# Patient Record
Sex: MALE
[De-identification: names, ages, dates, MRNs, and addresses within clinical notes are randomized per-mention and may not be internally consistent; named-entity substitution may affect disease eponyms.]

---

## 2017-05-18 ENCOUNTER — HOSPITAL ENCOUNTER (EMERGENCY)
Dept: HOSPITAL 25 - UCCORT | Age: 47
Discharge: HOME | End: 2017-05-18
Payer: SELF-PAY

## 2017-05-18 VITALS — DIASTOLIC BLOOD PRESSURE: 87 MMHG | SYSTOLIC BLOOD PRESSURE: 134 MMHG
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
## 2017-05-18 DIAGNOSIS — S39.011A: Primary | ICD-10-CM

## 2017-05-18 DIAGNOSIS — E66.9: ICD-10-CM
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
## 2017-05-18 PROCEDURE — 99202 OFFICE O/P NEW SF 15 MIN: CPT

## 2017-05-18 PROCEDURE — G0463 HOSPITAL OUTPT CLINIC VISIT: HCPCS

## 2017-05-18 NOTE — UC
Abdominal Pain Male HPI





- HPI Summary


HPI Summary: 





Was working in an attic crawl space around 1 pm today. Reached out for hand 

 and had a painful pulling in the left flank, with persistent pain 

since that time. No shortness of breath, able to void normally, no nausea or 

vomiting. Does not feel very well, but was working in attic space at high 

temperature for about 5 hours. Pain worsened by getting in and out of truck and 

all movements. Took acetaminophen 1000mg at the time, with a little relief of 

pain. 


Hx of abdominal surgery. 





- History of Current Complaint


Chief Complaint: UCAbdominalPain


Stated Complaint: LEFT LOWER ABD MUSCLE PULL W/C


Time Seen by Provider: 17 17:01


Hx Obtained From: Patient


Onset/Duration: Sudden Onset, Lasting Hours - 4, Still Present


Severity Initially: Moderate


Severity Currently: Moderate


Pain Intensity: 8


Pain Scale Used: 0-10 Numeric


Location: Other - left mid abdominal area.


Radiates: No


Character: Cramping, Sharp


Aggravating Factor(s):: Movement


Alleviating Factor(s): Rest





- Risk Factors


Testicular Torsion: Negative


Cardiac Risk Factors: Hypertension





- Allergies/Home Medications


Allergies/Adverse Reactions: 


 Allergies











Allergy/AdvReac Type Severity Reaction Status Date / Time


 


No Known Allergies Allergy   Verified 17 16:30











Home Medications: 


 Home Medications





NK [No Home Medications Reported]  17 [History Confirmed 17]











PMH/Surg Hx/FS Hx/Imm Hx





- Additional Past Medical History


Additional PMH: 





Has not seen PMD in 4 years, but blood pressures have run high in the past. Not 

treated. Aware of elevation. 


History of testicular cancer





- Surgical History


Surgical History: Yes


Surgery Procedure, Year, and Place: testicular surgery





- Family History


Known Family History: Positive: Respiratory Disease - mother  lung CA, 

Other - father  of stroke.





- Social History


Occupation: Employed Full-time - by [employer]


Lives: With Family


Alcohol Use: Occasionally


Substance Use Type: None


Smoking Status (MU): Never Smoked Tobacco





Review of Systems


Constitutional: Other - pain in the left abdomen with deep inspiration. Feeling 

unwell, but worked all day in an attic room with high ambient temperature.


ENT: Other


Cardiovascular: Other - elevated blood pressure in the past, not treated. He 

has not seen his PMD in about 4 years. No chest pain or shortness of breath.


Musculoskeletal: Myalgia - pain in the left abdominal wall.


All Other Systems Reviewed And Are Negative: Yes





Physical Exam


Triage Information Reviewed: Yes


Appearance: Pain Distress - moderate with movement., Obese


Vital Signs: 


 Initial Vital Signs











Temp  99.8 F   17 16:26


 


Pulse  95   17 16:26


 


Resp  17   17 16:26


 


BP  161/73   17 16:26


 


Pulse Ox  96   17 16:26











Vital Signs Reviewed: Yes


Eyes: Positive: Conjunctiva Clear


ENT: Positive: Pharynx normal


Neck: Positive: Supple, Nontender, No Lymphadenopathy


Respiratory: Positive: Lungs clear, Normal breath sounds


Cardiovascular: Positive: RRR, No Murmur


Abdomen Description: Positive: No Organomegaly, Soft, Other: - midline scar 

epigastrium to above pubis-->no evidence of incisional hernia.  Tender to 

palpation in the lateral oblique muscles. Pain with movement.  No abdominal 

guarding or rebound.


Bowel Sounds: Positive: Present


Musculoskeletal: Positive: Strength Intact, ROM Limited @ - uncomfortable with 

flexion at the waist.


Neurological Exam: Normal


Neurological: Positive: Alert, Muscle Tone Normal


Psychological Exam: Normal


Skin Exam: Normal





Abd Pain Male Course/Dx





- Course


Course Of Treatment: analgesics and rest.





- Differential Dx/Clinical Impression


Differential Diagnosis/HQI/PQRI: Other - hernia, muscle strain


Provider Diagnoses: strain left abdominal muscles (obliques)





Discharge





- Discharge Plan


Condition: Stable


Disposition: HOME


Patient Education Materials:  Muscle Strain (ED)


Forms:  *Work Release


Referrals: 


No Primary Care Phys,NOPCP [Primary Care Provider] - 


Additional Instructions: 


Today's blood pressure reading was elevated to 161/73, and decreased to 134/87 

at discharge (still a little elevated). Please ensure that you schedule a visit 

with your primary care provider. 


You can use acetaminophen 100mg up to 4 times per day for pain, and can use 

additional ibuprofen 600mg 2 or 3 times daily with caution.  Ensure that you 

take it with food. 


Rest at home tomorrow, anticipate return to work on 17

## 2017-08-07 ENCOUNTER — HOSPITAL ENCOUNTER (EMERGENCY)
Dept: HOSPITAL 25 - UCCORT | Age: 47
Discharge: HOME | End: 2017-08-07
Payer: COMMERCIAL

## 2017-08-07 VITALS — DIASTOLIC BLOOD PRESSURE: 88 MMHG | SYSTOLIC BLOOD PRESSURE: 155 MMHG

## 2017-08-07 DIAGNOSIS — T63.441A: Primary | ICD-10-CM
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
## 2017-08-07 DIAGNOSIS — Z85.47: ICD-10-CM

## 2017-08-07 DIAGNOSIS — M79.631: ICD-10-CM

## 2017-08-07 DIAGNOSIS — I10: ICD-10-CM

## 2017-08-07 DIAGNOSIS — Z72.0: ICD-10-CM

## 2017-08-07 PROCEDURE — 99212 OFFICE O/P EST SF 10 MIN: CPT

## 2017-08-07 PROCEDURE — G0463 HOSPITAL OUTPT CLINIC VISIT: HCPCS

## 2017-08-07 NOTE — UC
Skin Complaint HPI





- HPI Summary


HPI Summary: 





48 yo male stung on right thumb 3 days ago while at work





now pain and redness extending to dorsum or hand and radial aspect of forearm











- History of Current Complaint


Chief Complaint: UCSkin


Time Seen by Provider: 17 07:08


Stated Complaint: RIGHT HAND BEE STING-WC


Hx Obtained From: Patient


Onset/Duration: Gradual Onset


Onset Severity: Moderate


Current Severity: Moderate


Pain Intensity: 3


Pain Scale Used: 0-10 Numeric


Location: Discrete


Character: Swelling, Pain, Redness


Aggravating: Touch


Alleviating: Antihistamines


Associated Signs & Symptoms: Positive: Tenderness





- Allergy/Home Medications


Allergies/Adverse Reactions: 


 Allergies











Allergy/AdvReac Type Severity Reaction Status Date / Time


 


No Known Allergies Allergy   Verified 17 07:04











Home Medications: 


 Home Medications





diPHENhydraMINE PO* [Benadryl PO 25 MG TAB*] 50 mg PO ONCE PRN 17 [

History Confirmed 17]











Review of Systems


Constitutional: Negative


Skin: Negative


Eyes: Negative


ENT: Negative


Respiratory: Negative


Cardiovascular: Negative


Gastrointestinal: Negative


Genitourinary: Negative


Motor: Negative


Neurovascular: Negative


Musculoskeletal: Negative


Neurological: Negative


Psychological: Negative


All Other Systems Reviewed And Are Negative: Yes





PMH/Surg Hx/FS Hx/Imm Hx


Previously Healthy: Yes


Cardiovascular History: Hypertension - never treated


Cancer History: Other


Other Cancer History: testicular





- Surgical History


Surgical History: Yes


Surgery Procedure, Year, and Place: testicular surgery





- Family History


Known Family History: Positive: Hypertension, Respiratory Disease - mother  

lung CA, Other - father  of stroke.





- Social History


Alcohol Use: Occasionally


Substance Use Type: None


Smoking Status (MU): Current Some Day Smoker


Type: Smokeless Tobacco


Amount Used/How Often: occasional use





Physical Exam


Triage Information Reviewed: Yes


Appearance: Well-Appearing, No Pain Distress, Well-Nourished


Vital Signs: 


 Initial Vital Signs











Temp  98.2 F   17 07:05


 


Pulse  57   17 07:05


 


Resp  16   17 07:05


 


BP  155/88   17 07:05


 


Pulse Ox  99   17 07:05











Vital Signs Reviewed: Yes


Eyes: Positive: Conjunctiva Clear


ENT: Positive: Hearing grossly normal.  Negative: Nasal congestion, Nasal 

drainage, Tonsillar exudate, Trismus, Muffled/hoarse voice


Neck: Positive: Supple


Respiratory: Positive: Lungs clear, Normal breath sounds, No respiratory 

distress


Cardiovascular: Positive: RRR, No Murmur





Course/Dx





- Diagnoses


Provider Diagnoses: local reaction to insect stings.  ? cellulitis





Discharge





- Discharge Plan


Condition: Stable


Disposition: HOME


Prescriptions: 


Cephalexin CAP* [Keflex 500 CAP*] 500 mg PO QID #28 cap


Prednisone 60 mg PO DAILY #6 tab


Patient Education Materials:  Insect Bite or Sting (ED)


[place]: 


[facility] [place] (you need to find a primary care MD to follow 

your BP)


Additional Instructions: 


recheck in 3 days if not markedly improved











Images


Hands: 


  __________________________














  __________________________





 1 - stings





 2 - edema/redness





 3 - erthema/redness

## 2017-08-29 ENCOUNTER — HOSPITAL ENCOUNTER (EMERGENCY)
Dept: HOSPITAL 25 - UCEAST | Age: 47
Discharge: HOME | End: 2017-08-29
Payer: COMMERCIAL

## 2017-08-29 VITALS — DIASTOLIC BLOOD PRESSURE: 87 MMHG | SYSTOLIC BLOOD PRESSURE: 155 MMHG

## 2017-08-29 DIAGNOSIS — Z87.891: ICD-10-CM

## 2017-08-29 DIAGNOSIS — T78.40XA: ICD-10-CM

## 2017-08-29 DIAGNOSIS — T63.441A: Primary | ICD-10-CM

## 2017-08-29 PROCEDURE — G0463 HOSPITAL OUTPT CLINIC VISIT: HCPCS

## 2017-08-29 PROCEDURE — 99212 OFFICE O/P EST SF 10 MIN: CPT

## 2017-08-29 PROCEDURE — 96372 THER/PROPH/DIAG INJ SC/IM: CPT

## 2017-08-29 NOTE — UC
Skin Complaint HPI





- HPI Summary


HPI Summary: 





Patient presents following a bee sting to the left side of his neck, he 

complains of a sensation of tightness and closing of his throat. He also states 

that he has some pain at the site of the sting. He notes no previous allergies 

or problems from bee stings. 





- History of Current Complaint


Chief Complaint: UCAllergicReaction


Time Seen by Provider: 17 10:11


Stated Complaint: ALLERGIC REACTION


Hx Obtained From: Patient


Onset/Duration: Sudden Onset


Skin Exposure Onset/Duration: Minutes Ago


Onset Severity: Moderate


Current Severity: Moderate


Location: Discrete - left side of neck.


Aggravating: Touch


Alleviating: Nothing





- Allergy/Home Medications


Allergies/Adverse Reactions: 


 Allergies











Allergy/AdvReac Type Severity Reaction Status Date / Time


 


No Known Allergies Allergy   Verified 17 10:13














Review of Systems


Skin: Rash


ENT: Other - tighness in throat


Respiratory: Negative


Cardiovascular: Negative


Gastrointestinal: Negative


Genitourinary: Negative


Neurovascular: Negative


Musculoskeletal: Negative


All Other Systems Reviewed And Are Negative: Yes





PMH/Surg Hx/FS Hx/Imm Hx


Previously Healthy: Yes





- Surgical History


Surgical History: Yes


Surgery Procedure, Year, and Place: testicular surgery





- Family History


Known Family History: Positive: Hypertension, Respiratory Disease - mother  

lung CA, Other





- Social History


Occupation: Employed Full-time


Alcohol Use: None


Substance Use Type: None


Smoking Status (MU): Former Smoker


Type: Smokeless Tobacco


Amount Used/How Often: occasional use


Household Exposure Type: Cigarettes





Physical Exam


Triage Information Reviewed: Yes


Appearance: Well-Appearing


Vital Signs: 


 Initial Vital Signs











Temp  98.0 F   17 10:10


 


Pulse  65   17 10:10


 


Resp  18   17 10:10


 


BP  183/109   17 10:10


 


Pulse Ox  96   17 10:10











Vital Signs Reviewed: Yes


Eye Exam: Normal


ENT Exam: Normal


Neck exam: Normal


Neck: Positive: Other: - left anterior base of neck with red, raised circular 

area, no stinger, or puncture wound noted.


Respiratory Exam: Normal


Cardiovascular Exam: Normal


Abdominal Exam: Normal


Musculoskeletal: Positive: Strength Limited @ - left wrist, ROM Limited @ - 

left wrist, Edema @ - left wrist





Course/Dx





- Course


Course Of Treatment: Patient presents s/p bee sting with tightness in throat, 

epi 0.3mg, solu-medrol 125mg, and benedryl 50 mg, im was given. Patients 

symtpoms improved, he was monitored for 30 minutes following with no rebound 

and was dischared home with additional rx for the same. He was encouraged to go 

to the er if any other symtpoms returned. at discharge he was stable.





- Differential Diagnoses - Skin Complaint


Differential Diagnoses: Other - bee sting allergic reaction





- Diagnoses


Provider Diagnoses: bee sting.  allergic reaction





Discharge





- Discharge Plan


Condition: Stable


Disposition: HOME


Prescriptions: 


Epinephrine [Epipen 2-Darin] 0.3 mg IM ONCE PRN #1 inj


 PRN Reason: bee sting


Famotidine TAB* [Pepcid 20 MG TAB*] 20 mg PO BID #10 tab


diPHENhydraMINE PO* [Benadryl PO 25 MG TAB*] 25 mg PO Q6H PRN #20 tab


 PRN Reason: allergic reaction


predniSONE TAB* [Deltasone TAB*] 20 mg PO DAILY #5 tab


Patient Education Materials:  Insect Bite or Sting (ED), Allergies (ED)


Referrals: 


No Primary Care Phys,NOPCP [Primary Care Provider] -

## 2018-11-08 ENCOUNTER — HOSPITAL ENCOUNTER (EMERGENCY)
Dept: HOSPITAL 25 - UCCORT | Age: 48
Discharge: HOME | End: 2018-11-08
Payer: COMMERCIAL

## 2018-11-08 VITALS — SYSTOLIC BLOOD PRESSURE: 155 MMHG | DIASTOLIC BLOOD PRESSURE: 99 MMHG

## 2018-11-08 DIAGNOSIS — J18.9: Primary | ICD-10-CM

## 2018-11-08 DIAGNOSIS — Z87.891: ICD-10-CM

## 2018-11-08 PROCEDURE — 96372 THER/PROPH/DIAG INJ SC/IM: CPT

## 2018-11-08 PROCEDURE — 99213 OFFICE O/P EST LOW 20 MIN: CPT

## 2018-11-08 PROCEDURE — G0463 HOSPITAL OUTPT CLINIC VISIT: HCPCS

## 2018-11-08 PROCEDURE — 71046 X-RAY EXAM CHEST 2 VIEWS: CPT

## 2018-11-08 NOTE — ED
Respiratory





- HPI Summary


HPI Summary: 





pt presents for evaluation of his cough.  he states he has been sick for the 

past 3 weeks.  he states his wife has been sick and he just wants to sleep.  he 

has a productive cough.  he states that he has had intermittent occasional 

wheezing.  he denies any chest pain, n/v.





- History of Current Complaint


Chief Complaint: UCGeneralIllness


Stated Complaint: ACHY,COUGH,FATIGUE


Hx Obtained From: Patient


Onset/Duration: Gradual Onset


Initial Severity: Mild


Current Severity: Mild


Pain Intensity: 8


Character: Wheezing, Cough (Productive)


Sputum Amount: Small


Sputum Color: Yellow


Aggravating Factor(s): URI





- Allergy/Home Medications


Allergies/Adverse Reactions: 


 Allergies











Allergy/AdvReac Type Severity Reaction Status Date / Time


 


No Known Allergies Allergy   Verified 18 07:40














PMH/Surg Hx/FS Hx/Imm Hx


Previously Healthy: Yes


Endocrine/Hematology History: 


   Denies: Hx Anticoagulant Therapy


Respiratory History: 


   Denies: Hx Asthma





- Surgical History


Surgery Procedure, Year, and Place: testicular surgery


Infectious Disease History: No


Infectious Disease History: 


   Denies: Traveled Outside the [place] in Last 30 Days





- Family History


Known Family History: Positive: Hypertension, Respiratory Disease - mother  

lung CA, Other





- Social History


Alcohol Use: None


Substance Use Type: Reports: None


Smoking Status (MU): Former Smoker


Type: Smokeless Tobacco


Amount Used/How Often: occasional use





Review of Systems


Positive: Fatigue.  Negative: Fever, Chills, Skin Diaphoresis


Eyes: Negative


ENT: Negative


Cardiovascular: Negative


Positive: Cough


Gastrointestinal: Negative


Genitourinary: Negative


Musculoskeletal: Negative


Skin: Negative


Neurological: Negative


Psychological: Normal


All Other Systems Reviewed And Are Negative: No





Physical Exam


Triage Information Reviewed: Yes


Vital Signs On Initial Exam: 


 Initial Vitals











Temp Pulse Resp BP Pulse Ox


 


 98.1 F   78   16   155/99   98 


 


 18 07:36  18 07:36  18 07:36  18 07:36  18 07:36











Vital Signs Reviewed: Yes


Appearance: Positive: Well-Appearing, No Pain Distress, Well-Nourished


Skin: Positive: Warm, Dry


Head/Face: Positive: Normal Head/Face Inspection


Eyes: Positive: Normal, EOMI, LAY


ENT: Positive: Normal ENT inspection, Hearing grossly normal, Pharynx normal


Neck: Positive: Supple, Nontender


Respiratory/Lung Sounds: Positive: Clear to Auscultation, Breath Sounds Present


Cardiovascular: Positive: Normal, RRR


Abdomen Description: Positive: Nontender, Soft


Bowel Sounds: Positive: Present


Musculoskeletal: Positive: Normal


Neurological: Positive: Normal, Sensory/Motor Intact, Alert, Oriented to Person 

Place, Time, CN Intact II-III


Psychiatric: Positive: Normal


AVPU Assessment: Alert





Diagnostics





- Vital Signs


 Vital Signs











  Temp Pulse Resp BP Pulse Ox


 


 18 07:36  98.1 F  78  16  155/99  98














- Laboratory


Lab Statement: Any lab studies that have been ordered have been reviewed, and 

results considered in the medical decision making process.





Disposition





- Course


Course Of Treatment: xray shows a left upper lobe pneumonia.  pt is non-toxic.  

pt was given im rocephin and po azithromycin.  pt given spacer and albuterol 

inhaler with instructions.  pt instructed to f/u with pcp by MOnday or go to 

the closest ED for further evaluation and care.  pt given a work excuse for 3 

days.





- Diagnoses


Provider Diagnoses: 


 Pneumonia








Discharge





- Sign-Out/Discharge


Documenting (check all that apply): Patient Departure


All imaging exams completed and their final reports reviewed: Yes





- Discharge Plan


Condition: Stable


Disposition: HOME


Prescriptions: 


Azithromyxin JEY (NF) [Z-Jey (Zithromax) 250 mg tabs #6] 2 tab PO .TODAY, THEN 

1 DAILY #6 tab


Patient Education Materials:  Bacterial Pneumonia (ED)


Referrals: 


No Primary Care Phys,NOPCP [Primary Care Provider] - 


[facility] [Provider Group]


Additional Instructions: 


take the antibiotic as instructed.  use the inhaler and spacer as instructed.  

return if worse or any new symptoms.  It is imperative to f/u with your primary 

care physician.  if you feel worse or are not better, you should go to the 

closest emergency dept for further evaluation. 





- Billing Disposition and Condition


Condition: STABLE


Disposition: Home

## 2018-11-21 ENCOUNTER — HOSPITAL ENCOUNTER (EMERGENCY)
Dept: HOSPITAL 25 - UCEAST | Age: 48
Discharge: HOME | End: 2018-11-21
Payer: COMMERCIAL

## 2018-11-21 VITALS — DIASTOLIC BLOOD PRESSURE: 92 MMHG | SYSTOLIC BLOOD PRESSURE: 161 MMHG

## 2018-11-21 DIAGNOSIS — Z87.891: ICD-10-CM

## 2018-11-21 DIAGNOSIS — R03.0: ICD-10-CM

## 2018-11-21 DIAGNOSIS — Z91.030: ICD-10-CM
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
## 2018-11-21 DIAGNOSIS — Z23: ICD-10-CM
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
## 2018-11-21 DIAGNOSIS — S51.811A: Primary | ICD-10-CM
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
## 2018-11-21 PROCEDURE — 90715 TDAP VACCINE 7 YRS/> IM: CPT

## 2018-11-21 PROCEDURE — 99211 OFF/OP EST MAY X REQ PHY/QHP: CPT

## 2018-11-21 PROCEDURE — G0463 HOSPITAL OUTPT CLINIC VISIT: HCPCS

## 2018-11-21 PROCEDURE — 90471 IMMUNIZATION ADMIN: CPT

## 2018-11-21 PROCEDURE — 12031 INTMD RPR S/A/T/EXT 2.5 CM/<: CPT

## 2018-11-21 NOTE — UC
Laceration HPI





- HPI Summary


HPI Summary: 


48 year old male with laceration to right proximal forearm that occurred at 

work. States he accidentally cut on edge of a sheet metal shelf. Bleeding 

controlled with direct pressure prior to arrival. Tetanus status unknown.








- History Of Current Complaint


Chief Complaint: UCLaceration


Stated Complaint: ARM LAC


Time Seen by Provider: 18 12:24


Hx Obtained From: Patient


Laceration Location: Arm


Onset/Duration: Sudden Onset


Pain Intensity: 0


Full Body (No Head): 


  __________________________














  __________________________





 1 - 2 cm laceration with poorly approximated wound edges and exposed adipose 

tissure. Bleeding controlled.





Related History: Occupational Injury, Dominant Hand Right





- Allergies/Home Medications


Allergies/Adverse Reactions: 


 Allergies











Allergy/AdvReac Type Severity Reaction Status Date / Time


 


bee venom protein (honey bee) Allergy Severe Anaphylatic Verified 18 12:56





   Shock  














PMH/Surg Hx/FS Hx/Imm Hx


Previously Healthy: Yes - Denies significant PMH


Other History Of: 


   Negative For: Anticoagulant Therapy





- Surgical History


Surgical History: Yes


Surgery Procedure, Year, and Place: testicular surgery





- Family History


Known Family History: Positive: Hypertension, Respiratory Disease - mother  

lung CA, Other





- Social History


Occupation: Employed Full-time


Lives: With Family


Alcohol Use: Occasionally


Substance Use Type: None


Smoking Status (MU): Former Smoker


Type: Smokeless Tobacco


Amount Used/How Often: occasional use


Household Exposure Type: Cigarettes





Review of Systems


All Other Systems Reviewed And Are Negative: Yes


Constitutional: Negative: Fever, Chills


Skin: Positive: Other - See HPI and diagram


Motor: Negative: Decreased ROM, Weakness


Neurovascular: Negative: Decreased Sensation


Musculoskeletal: Positive: Negative


Is Patient Immunocompromised?: No





Physical Exam


Triage Information Reviewed: Yes


Appearance: Well-Appearing, No Pain Distress, Well-Nourished


Vital Signs: 


 Initial Vital Signs











Temp  98.5 F   18 12:50


 


Pulse  83   18 12:50


 


Resp  18   18 12:50


 


BP  179/106   18 12:50


 


Pulse Ox  94   18 12:50











Respiratory: Positive: Lungs clear, Normal breath sounds, No respiratory 

distress


Cardiovascular: Positive: RRR, No Murmur, Pulses Normal, Brisk Capillary Refill


Musculoskeletal: Positive: Strength Intact, ROM Intact, No Edema


Neurological: Positive: Alert, Other: - Sensation intact distally


Skin: Positive: Significant Lesion(s) - See diagram





Laceration Repair





- Laceration Repair


  ** 1


Procedure Summary: 


Procedure note: Laceration repair right forearm


 


Informed consent was obtained before procedure started and the appropriate 

timeout was taken. Local anesthesia was achieved using 5 ml of lidocaine 1% 

without epinephrine. The wound was copiously irrigated with 500 ml sterile 

saline. The wound was thoroughly explored and no foreign body noted. Exposed 

adipose tissue was sharply excised using iris scissors. The wound margins were 

brought into good alignment and 3 interrupted sutures were placed using 4-0 

Ethilon.





Estimated blood loss was minimal. A dressing was applied to the area. 

Anticipatory guidance, as well as standard post-procedure care was discussed 

with patient. Return precautions are given. The patient tolerated the procedure 

well without complications.





Patient is to follow up in 10-14 days for suture removal and evaluation of the 

laceration.





Description: Linear


Laceration Size After Repair: Length (cm) - 2 cm


Contamination/FB Removal: None


Modified For Repair: Yes - Adipose tissue sharply excised


Anesthesia Used: 1.0% Lido


Cleansing Completed Via Routine Prep: Yes


Irrigation With Pressure Irrigation Device: Yes


Closure Material: Sutures


Closure Method: Single Layer


Suture Of: Skin


Suture Type: Nylon





Laceration Course/Dx





- Course/Dx


Course Of Treatment: 48 year old male presents with laceration to proximal 

right forearm that occurred at work when he accidentally cut on a metal shelf. 

Bleeding was controlled with direct pressure prior to arrival. The wound was 

irrigated and repaired with 3 interrupted sutures using 4-0 Ethilon. Dressing 

applied by RN. Tetanus updated. Wound care and warning symptoms were reivewed 

with patient. He is to return in 10-14 days for suture removal. Verbalizes 

understanding and agrees with POC.





- Differential Dx - Laceration/Wound


Provider Diagnoses: left arm laceration, elevated blood pressure reading





Discharge





- Sign-Out/Discharge


Documenting (check all that apply): Patient Departure


All imaging exams completed and their final reports reviewed: No Studies





- Discharge Plan


Condition: Stable


Disposition: HOME


Patient Education Materials:  Care For Your Stitches (ED), Laceration (ED)


Referrals: 


No Primary Care Phys,NOPCP [Primary Care Provider] - 


[facility] [place] [Outside]


Additional Instructions: 


Leave the dressing that was applied in the clinic in place for the next 24 

hours. Be sure to keep this clean and dry. After 24 hours you may remove the 

dressing and shower and wash your hands as normal. Avoid submerging you hand 

under water such as in taking a bath, swimming, or washing dishes until the 

sutures are removed.





You should apply some antibiotic ointment such as Bacitracin to the wound and 

cover with a clean gauze dressing. This dressing should be changed at least 

once a day or any time it becomes wet or soiled.





Your tetanus was updated in the clinic today. Be sure to contact your primary 

care provider to let them know so that your records can be updated.





The numbing medication used to repair your laceration will begin to wear off in 

about 3-4 hours. You may use acetaminophen (Tylenol) or ibuprofen (Advil, Motrin

) according to directions as needed for pain.





Return here in 10-14 days for suture removal.





Watch for signs of infection including fever greater than 100.5 F, redness that 

spreads, swelling of the hand, pain that is not managed with pain medication, 

numbness or tingling in the hand or fingers, or pus draining from the wound. 

Seek immediate medical attention should any of these occur.





You blood pressure was elevated in the clinic today. It is recommended that you 

have this rechecked by a primary care provider within the next 4 weeks. I have 

given you the number for the [facility] Physician Referral Service if you 

need assistance with establishing with a provider.





- Billing Disposition and Condition


Condition: STABLE


Disposition: Home

## 2019-03-12 ENCOUNTER — HOSPITAL ENCOUNTER (EMERGENCY)
Dept: HOSPITAL 25 - UCEAST | Age: 49
Discharge: HOME | End: 2019-03-12
Payer: COMMERCIAL

## 2019-03-12 VITALS — DIASTOLIC BLOOD PRESSURE: 100 MMHG | SYSTOLIC BLOOD PRESSURE: 200 MMHG

## 2019-03-12 DIAGNOSIS — J32.9: Primary | ICD-10-CM

## 2019-03-12 DIAGNOSIS — Z87.891: ICD-10-CM

## 2019-03-12 DIAGNOSIS — Z91.030: ICD-10-CM

## 2019-03-12 LAB
FLUAV RNA SPEC QL NAA+PROBE: NEGATIVE
FLUBV RNA SPEC QL NAA+PROBE: NEGATIVE

## 2019-03-12 PROCEDURE — 99212 OFFICE O/P EST SF 10 MIN: CPT

## 2019-03-12 PROCEDURE — G0463 HOSPITAL OUTPT CLINIC VISIT: HCPCS

## 2019-03-12 PROCEDURE — 71046 X-RAY EXAM CHEST 2 VIEWS: CPT

## 2019-03-12 NOTE — UC
Respiratory Complaint HPI





- HPI Summary


HPI Summary: 





49 yo male presents with sore throat, b/l earache, body aches, sinus pain/

pressure/congestion, and productive cough over the last 4-5 days. He tells me 

that he had pneumonia about 3-4 months and this feels similar. He wife is sick 

with similar symptoms. He does not smoke, but his wife does and is around her a 

lot. He has been taking mucinex OTC with little relief. Has felt feverish, but 

has not taken his temperature. Denies SOB, chest pain, abdominal pain, n/v.











- History of Current Complaint


Chief Complaint: UCRespiratory


Stated Complaint: CHEST CONGEST


Time Seen by Provider: 19 10:28


Hx Obtained From: Patient


Onset/Duration: Gradual Onset


Severity Initially: Moderate


Severity Currently: Severe


Pain Intensity: 10


Character: Cough: Productive





- Allergies/Home Medications


Allergies/Adverse Reactions: 


 Allergies











Allergy/AdvReac Type Severity Reaction Status Date / Time


 


bee venom protein (honey bee) Allergy Severe Anaphylatic Verified 19 10:07





   Shock  











Home Medications: 


 Home Medications





Acetaminophen [Tylenol Extra Strength] 1,500 mg PO ONCE PRN 19 [History 

Confirmed 19]


guaiFENesin [Mucinex] 1 tab PO ONCE PRN 19 [History Confirmed 19]











PMH/Surg Hx/FS Hx/Imm Hx


Cardiovascular History: Hypertension


Other History Of: 


   Negative For: Anticoagulant Therapy





- Surgical History


Surgical History: Yes


Surgery Procedure, Year, and Place: testicular surgery





- Family History


Known Family History: Positive: Hypertension, Respiratory Disease - mother  

lung CA, Other





- Social History


Occupation: Employed Full-time


Lives: With Family


Alcohol Use: Occasionally


Substance Use Type: None


Smoking Status (MU): Former Smoker


Type: Smokeless Tobacco


Amount Used/How Often: occasional use


Household Exposure Type: Cigarettes





Review of Systems


All Other Systems Reviewed And Are Negative: Yes


Constitutional: Positive: Chills, Fatigue, Other - Body aches


Skin: Positive: Negative


Eyes: Positive: Negative


ENT: Positive: Sore Throat, Ear Ache, Nasal Discharge, Sinus Congestion, Sinus 

Pain/Tenderness


Respiratory: Positive: Cough


Cardiovascular: Positive: Negative


Gastrointestinal: Positive: Negative


Neurovascular: Positive: Negative


Neurological: Positive: Negative


Psychological: Positive: Negative





Physical Exam





- Summary


Physical Exam Summary: 





GENERAL: NAD. WDWN. No pain distress.


SKIN: No rashes, sores, lesions, or open wounds.


HEENT:


            Head: AT/NC


            Eyes:  EOM intact. Conjunctiva clear without inflammation or 

discharge.


            Ears: Hearing grossly normal. TMs intact, no bulging, erythema, or 

edema. 


            Nose: Nasal mucosa mildly swollen and erythematous with yellow/

clear discharge. TTP maxillary and frontal sinus. Positive post nasal drip


            Throat: Posterior oropharynx without exudates, erythema, or 

tonsillar enlargement.  Uvula midline.


NECK: Supple. Nontender. No lymphadenopathy. 


CHEST:  CTAB. No r/r/w. No accessory muscle use. Breathing comfortably and in 

no distress.


CV:  RRR. Without m/r/g. Pulses intact. 


NEURO: Alert.


PSYCH: Age appropriate behavior.





Triage Information Reviewed: Yes


Vital Signs: 


 Initial Vital Signs











Temp  99.4 F   19 10:03


 


Pulse  93   19 10:03


 


Resp  18   19 10:03


 


BP  193/115   19 10:03


 


Pulse Ox  97   19 10:03








 Laboratory Tests











  19





  10:35


 


Influenza A (Rapid)  Negative


 


Influenza B (Rapid)  Negative











Vital Signs Reviewed: Yes





Respiratory Course/Dx





- Course


Course Of Treatment: CXR: IMPRESSION: NO EVIDENCE FOR ACTIVE CARDIOPULMONARY 

DISEASE.  POC flu negative.  Suspect sinusitis vs viral syndrome. Pt prefers to 

be on antibiotics at this time. His BP is elevated today. He tells me that he 

was on blood pressure medication, but stopped taking it because it didn't want 

to take it anymore. I advised him to take his medications as prescribed and f/u 

with his PCP as soon as possible.





- Differential Dx/Diagnosis


Provider Diagnosis: 


 Sinusitis








Discharge





- Sign-Out/Discharge


Documenting (check all that apply): Patient Departure


All imaging exams completed and their final reports reviewed: Yes





- Discharge Plan


Condition: Stable


Disposition: HOME


Prescriptions: 


Azithromycin TAB* [Zithromax TAB (Z-JEY) 250 mg #6 tabs] 2 tab PO .TODAY, THEN 

1 DAILY #1 jey


Patient Education Materials:  Acute Bronchitis (ED)


Forms:  *Work Release


Referrals: 


No Primary Care Phys,NOPCP [Primary Care Provider] - 


Additional Instructions: 


If you develop a fever, shortness of breath, chest pain, new or worsening 

symptoms - please call your PCP or go to the ED.


 





Your blood pressure was high at todays visit. Please see your primary provider 

within 4 weeks for recheck and re-evaluation.








- Billing Disposition and Condition


Condition: STABLE


Disposition: Home